# Patient Record
Sex: FEMALE | Race: WHITE | NOT HISPANIC OR LATINO
[De-identification: names, ages, dates, MRNs, and addresses within clinical notes are randomized per-mention and may not be internally consistent; named-entity substitution may affect disease eponyms.]

---

## 2018-10-16 ENCOUNTER — RESULT REVIEW (OUTPATIENT)
Age: 38
End: 2018-10-16

## 2019-08-11 PROBLEM — Z00.00 ENCOUNTER FOR PREVENTIVE HEALTH EXAMINATION: Status: ACTIVE | Noted: 2019-08-11

## 2019-09-10 ENCOUNTER — APPOINTMENT (OUTPATIENT)
Dept: BREAST CENTER | Facility: CLINIC | Age: 39
End: 2019-09-10
Payer: COMMERCIAL

## 2019-09-10 VITALS
BODY MASS INDEX: 19.99 KG/M2 | OXYGEN SATURATION: 99 % | RESPIRATION RATE: 18 BRPM | WEIGHT: 135 LBS | HEART RATE: 83 BPM | DIASTOLIC BLOOD PRESSURE: 79 MMHG | HEIGHT: 69 IN | SYSTOLIC BLOOD PRESSURE: 106 MMHG

## 2019-09-10 PROCEDURE — 99203 OFFICE O/P NEW LOW 30 MIN: CPT

## 2019-09-20 ENCOUNTER — RESULT REVIEW (OUTPATIENT)
Age: 39
End: 2019-09-20

## 2019-10-14 ENCOUNTER — APPOINTMENT (OUTPATIENT)
Dept: BREAST CENTER | Facility: CLINIC | Age: 39
End: 2019-10-14
Payer: COMMERCIAL

## 2019-10-14 VITALS
RESPIRATION RATE: 18 BRPM | BODY MASS INDEX: 19.99 KG/M2 | SYSTOLIC BLOOD PRESSURE: 134 MMHG | HEIGHT: 69 IN | DIASTOLIC BLOOD PRESSURE: 86 MMHG | OXYGEN SATURATION: 100 % | WEIGHT: 135 LBS | HEART RATE: 95 BPM

## 2019-10-14 PROCEDURE — 99213 OFFICE O/P EST LOW 20 MIN: CPT

## 2019-11-26 ENCOUNTER — APPOINTMENT (OUTPATIENT)
Dept: PLASTIC SURGERY | Facility: CLINIC | Age: 39
End: 2019-11-26
Payer: COMMERCIAL

## 2019-11-26 VITALS
HEART RATE: 81 BPM | RESPIRATION RATE: 18 BRPM | OXYGEN SATURATION: 100 % | DIASTOLIC BLOOD PRESSURE: 80 MMHG | SYSTOLIC BLOOD PRESSURE: 107 MMHG

## 2019-11-26 PROCEDURE — 99202 OFFICE O/P NEW SF 15 MIN: CPT

## 2019-12-02 ENCOUNTER — APPOINTMENT (OUTPATIENT)
Dept: BREAST CENTER | Facility: CLINIC | Age: 39
End: 2019-12-02
Payer: COMMERCIAL

## 2019-12-02 VITALS
SYSTOLIC BLOOD PRESSURE: 120 MMHG | DIASTOLIC BLOOD PRESSURE: 72 MMHG | OXYGEN SATURATION: 100 % | HEIGHT: 69 IN | RESPIRATION RATE: 18 BRPM | BODY MASS INDEX: 19.99 KG/M2 | HEART RATE: 68 BPM | WEIGHT: 135 LBS

## 2019-12-02 PROCEDURE — 99213 OFFICE O/P EST LOW 20 MIN: CPT

## 2019-12-02 NOTE — HISTORY OF PRESENT ILLNESS
[FreeTextEntry1] : 38 y/o woman with strong family history of breast cancer and BRCA2 VUS.\par Collecting information to make decision about undergoing prophylactic mastectomy.\par Today we reviewed options for breast reconstruction.\par \par PMH significant for DVT (clotting disorder). \par History of multiple orthopaedic and plastic surgery procedures, which were uneventful.\par \par B/l b cup breasts. Nonptotic. \par Slender abdomen. \par \par Today we reviewed all options for breast reconstruction including implant based and autologous tissue based.\par Patient wishes to undergo implant based reconstruction. This would most likely be performed as a two stage TE to implant reconstruction.\par NAture and timing of the surgery, risks, benefits, alternatives, expected postoperative course, recovery and long term results were reviewed. All questions were answered.\par Ms. Alva will further consider her decision about undergoing prophylactic surgery.

## 2020-01-14 ENCOUNTER — RESULT REVIEW (OUTPATIENT)
Age: 40
End: 2020-01-14

## 2020-01-15 ENCOUNTER — APPOINTMENT (OUTPATIENT)
Dept: BREAST CENTER | Facility: HOSPITAL | Age: 40
End: 2020-01-15
Payer: COMMERCIAL

## 2020-01-15 PROCEDURE — 19303 MAST SIMPLE COMPLETE: CPT | Mod: 50

## 2020-01-21 ENCOUNTER — APPOINTMENT (OUTPATIENT)
Dept: PLASTIC SURGERY | Facility: CLINIC | Age: 40
End: 2020-01-21
Payer: COMMERCIAL

## 2020-01-21 VITALS
WEIGHT: 135 LBS | DIASTOLIC BLOOD PRESSURE: 89 MMHG | HEART RATE: 96 BPM | RESPIRATION RATE: 18 BRPM | OXYGEN SATURATION: 100 % | SYSTOLIC BLOOD PRESSURE: 129 MMHG | BODY MASS INDEX: 19.99 KG/M2 | HEIGHT: 69 IN

## 2020-01-21 PROCEDURE — 99024 POSTOP FOLLOW-UP VISIT: CPT

## 2020-01-21 NOTE — HISTORY OF PRESENT ILLNESS
[FreeTextEntry1] : 1 week s/p b/l mastectomy and total muscular coverage TE placement.\par Expanders used are: Ref [id number] with 400 cc capacity. Prefilled to 150 cc.\par Current volumes are:\par Lpdx=202\par Right=150\par Incisions intact, no collections, no infections. No pain issues.\par B/l drains removed.\par Will begin expansion in 2 weeks.

## 2020-01-31 ENCOUNTER — APPOINTMENT (OUTPATIENT)
Dept: PLASTIC SURGERY | Facility: CLINIC | Age: 40
End: 2020-01-31
Payer: COMMERCIAL

## 2020-01-31 VITALS
HEIGHT: 69 IN | RESPIRATION RATE: 18 BRPM | BODY MASS INDEX: 19.85 KG/M2 | DIASTOLIC BLOOD PRESSURE: 76 MMHG | OXYGEN SATURATION: 100 % | HEART RATE: 100 BPM | WEIGHT: 134 LBS | SYSTOLIC BLOOD PRESSURE: 132 MMHG

## 2020-01-31 PROCEDURE — 99024 POSTOP FOLLOW-UP VISIT: CPT

## 2020-02-03 NOTE — HISTORY OF PRESENT ILLNESS
[FreeTextEntry1] : s/p b/l mastectomy and total muscular coverage TE placement.\par Expanders used are: Ref [id number] with 400 cc capacity. Prefilled to 150 cc.\par Current volumes are:\par Nwhd=433\par Right=150\par Incisions intact, no collections, no infections. No pain issues.\par Patient developed 2x3 cm area of skin blistering over left breast. Advised to continue aquaphor/nonstick gauze dressing.\par Pollow up in 1 week.\par Will begin expansion in 2 weeks.

## 2020-02-04 ENCOUNTER — APPOINTMENT (OUTPATIENT)
Dept: PLASTIC SURGERY | Facility: CLINIC | Age: 40
End: 2020-02-04
Payer: COMMERCIAL

## 2020-02-04 VITALS — OXYGEN SATURATION: 100 % | HEART RATE: 81 BPM | DIASTOLIC BLOOD PRESSURE: 89 MMHG | SYSTOLIC BLOOD PRESSURE: 124 MMHG

## 2020-02-04 PROCEDURE — 99024 POSTOP FOLLOW-UP VISIT: CPT

## 2020-02-04 NOTE — HISTORY OF PRESENT ILLNESS
[FreeTextEntry1] : s/p b/l mastectomy and total muscular coverage TE placement.\par Expanders used are: Ref [id number] with 400 cc capacity. Prefilled to 150 cc.\par Current volumes are:\par Pahc=907\par Right=150\par Incisions intact, no collections, no infections. No pain issues.\par Patient developed 2x3 cm area of skin blistering over left breast. Advised to continue aquaphor/nonstick gauze dressing. Area now healed.\par Follow up in 1 week to begin expansions

## 2020-02-11 ENCOUNTER — APPOINTMENT (OUTPATIENT)
Dept: PLASTIC SURGERY | Facility: CLINIC | Age: 40
End: 2020-02-11
Payer: COMMERCIAL

## 2020-02-11 VITALS
RESPIRATION RATE: 18 BRPM | WEIGHT: 134 LBS | HEIGHT: 69 IN | HEART RATE: 88 BPM | SYSTOLIC BLOOD PRESSURE: 129 MMHG | OXYGEN SATURATION: 99 % | BODY MASS INDEX: 19.85 KG/M2 | DIASTOLIC BLOOD PRESSURE: 89 MMHG

## 2020-02-11 PROCEDURE — 99024 POSTOP FOLLOW-UP VISIT: CPT

## 2020-02-13 NOTE — HISTORY OF PRESENT ILLNESS
[FreeTextEntry1] : s/p b/l mastectomy and total muscular coverage TE placement.\par Expanders used are: Ref [id number] with 400 cc capacity. Prefilled to 150 cc.\par Today 120 cc added to each expander\par Current volumes are:\par Adkw=847\par Right=270\par Incisions intact, no collections, no infections. No pain issues.\par \par Follow up in 1 week for additional expansion

## 2020-02-24 ENCOUNTER — APPOINTMENT (OUTPATIENT)
Dept: PLASTIC SURGERY | Facility: CLINIC | Age: 40
End: 2020-02-24
Payer: COMMERCIAL

## 2020-02-24 ENCOUNTER — APPOINTMENT (OUTPATIENT)
Dept: PLASTIC SURGERY | Facility: CLINIC | Age: 40
End: 2020-02-24

## 2020-02-24 VITALS
RESPIRATION RATE: 16 BRPM | DIASTOLIC BLOOD PRESSURE: 79 MMHG | HEART RATE: 96 BPM | WEIGHT: 134 LBS | SYSTOLIC BLOOD PRESSURE: 120 MMHG | OXYGEN SATURATION: 98 % | BODY MASS INDEX: 19.85 KG/M2 | HEIGHT: 69 IN

## 2020-02-24 PROCEDURE — 99024 POSTOP FOLLOW-UP VISIT: CPT

## 2020-03-03 ENCOUNTER — APPOINTMENT (OUTPATIENT)
Dept: PLASTIC SURGERY | Facility: CLINIC | Age: 40
End: 2020-03-03
Payer: COMMERCIAL

## 2020-03-03 VITALS
RESPIRATION RATE: 16 BRPM | DIASTOLIC BLOOD PRESSURE: 87 MMHG | WEIGHT: 134 LBS | SYSTOLIC BLOOD PRESSURE: 124 MMHG | OXYGEN SATURATION: 99 % | HEIGHT: 69 IN | HEART RATE: 81 BPM | BODY MASS INDEX: 19.85 KG/M2

## 2020-03-03 PROCEDURE — 99024 POSTOP FOLLOW-UP VISIT: CPT

## 2020-03-05 NOTE — HISTORY OF PRESENT ILLNESS
[FreeTextEntry1] : s/p b/l mastectomy and total muscular coverage TE placement.\par Expanders used are: Ref [id number] with 400 cc capacity. Prefilled to 150 cc.\par Today 120 cc added to each expander\par Current volumes are:\par Ggnr=892\par Right=390\par Incisions intact, no collections, no infections. No pain issues.\par \par Follow up in 1 week for additional expansion

## 2020-03-05 NOTE — HISTORY OF PRESENT ILLNESS
[FreeTextEntry1] : s/p b/l mastectomy and total muscular coverage TE placement.\par Expanders used are: Ref [id number] with 400 cc capacity. Prefilled to 150 cc.\par Today 60 cc added to each left expander and \par 90 cc added to right expander\par Current volumes are:\par Dcrp=938\par Right=480\par Incisions intact, no collections, no infections. No pain issues.\par \par Follow up in 1 week to assess volume and determine size of definitive implants

## 2020-03-17 ENCOUNTER — APPOINTMENT (OUTPATIENT)
Dept: PLASTIC SURGERY | Facility: CLINIC | Age: 40
End: 2020-03-17

## 2020-05-05 ENCOUNTER — APPOINTMENT (OUTPATIENT)
Dept: BREAST CENTER | Facility: CLINIC | Age: 40
End: 2020-05-05

## 2020-07-20 ENCOUNTER — APPOINTMENT (OUTPATIENT)
Dept: PLASTIC SURGERY | Facility: CLINIC | Age: 40
End: 2020-07-20

## 2020-07-28 ENCOUNTER — APPOINTMENT (OUTPATIENT)
Dept: PLASTIC SURGERY | Facility: CLINIC | Age: 40
End: 2020-07-28

## 2020-07-28 ENCOUNTER — APPOINTMENT (OUTPATIENT)
Dept: PLASTIC SURGERY | Facility: CLINIC | Age: 40
End: 2020-07-28
Payer: COMMERCIAL

## 2020-07-28 PROCEDURE — 99499 UNLISTED E&M SERVICE: CPT | Mod: NC

## 2020-08-18 ENCOUNTER — APPOINTMENT (OUTPATIENT)
Dept: PLASTIC SURGERY | Facility: CLINIC | Age: 40
End: 2020-08-18
Payer: COMMERCIAL

## 2020-08-18 VITALS
SYSTOLIC BLOOD PRESSURE: 144 MMHG | HEART RATE: 57 BPM | OXYGEN SATURATION: 100 % | HEIGHT: 69 IN | BODY MASS INDEX: 19.85 KG/M2 | DIASTOLIC BLOOD PRESSURE: 90 MMHG | WEIGHT: 134 LBS

## 2020-08-18 DIAGNOSIS — Z01.818 ENCOUNTER FOR OTHER PREPROCEDURAL EXAMINATION: ICD-10-CM

## 2020-08-18 PROCEDURE — 99214 OFFICE O/P EST MOD 30 MIN: CPT

## 2020-08-18 NOTE — HISTORY OF PRESENT ILLNESS
[FreeTextEntry1] : s/p b/l mastectomy and total muscular coverage TE placement.\par Expanders used are: Ref [id number] with 400 cc\par Current volumes are:\par Puyv=383\par Right=480\par Left expander has rotated slightly.\par Patient desires greater breast volume, beyond volume of expanders. Excellent skin quality and ellasticity. Anticipate mastectomy pockets to comfortably accommodate implants in 520-560 cc range.\par Surgery scheduled for next week.

## 2020-08-19 ENCOUNTER — LABORATORY RESULT (OUTPATIENT)
Age: 40
End: 2020-08-19

## 2020-08-19 ENCOUNTER — APPOINTMENT (OUTPATIENT)
Dept: PLASTIC SURGERY | Facility: CLINIC | Age: 40
End: 2020-08-19

## 2020-08-21 ENCOUNTER — RESULT REVIEW (OUTPATIENT)
Age: 40
End: 2020-08-21

## 2020-08-24 ENCOUNTER — RESULT REVIEW (OUTPATIENT)
Age: 40
End: 2020-08-24

## 2020-09-01 ENCOUNTER — APPOINTMENT (OUTPATIENT)
Dept: PLASTIC SURGERY | Facility: CLINIC | Age: 40
End: 2020-09-01
Payer: COMMERCIAL

## 2020-09-01 VITALS
BODY MASS INDEX: 19.55 KG/M2 | RESPIRATION RATE: 18 BRPM | WEIGHT: 132 LBS | OXYGEN SATURATION: 100 % | HEIGHT: 69 IN | SYSTOLIC BLOOD PRESSURE: 141 MMHG | HEART RATE: 103 BPM | DIASTOLIC BLOOD PRESSURE: 84 MMHG

## 2020-09-01 PROCEDURE — 99024 POSTOP FOLLOW-UP VISIT: CPT

## 2020-09-01 NOTE — HISTORY OF PRESENT ILLNESS
[FreeTextEntry1] : s/p TE lashanda implant exchange last week.\par Implants are Ref#: [id number] b/l\par Incisions intact, no collections, no infections.\par Good shape, size, symmetry.\par Wound care and activity limitations reviewed.\par Follow up in 3 months.

## 2021-04-13 ENCOUNTER — APPOINTMENT (OUTPATIENT)
Dept: PLASTIC SURGERY | Facility: CLINIC | Age: 41
End: 2021-04-13
Payer: COMMERCIAL

## 2021-04-13 PROCEDURE — 99072 ADDL SUPL MATRL&STAF TM PHE: CPT

## 2021-04-13 PROCEDURE — 99212 OFFICE O/P EST SF 10 MIN: CPT

## 2021-04-13 NOTE — HISTORY OF PRESENT ILLNESS
[FreeTextEntry1] : 8 months s/p TE lashanda implant exchange. \par Implants are Ref#: [id number] b/l\par implants in place b/l\par Good shape, size, symmetry.\par rippling and contour irregularity in left breast due to lack of soft tissue coverage and minor widening of pocket, can revise with fat grafting and tightening the skin envelope but she is not interested in surgery at this time\par

## 2021-07-14 ENCOUNTER — RESULT REVIEW (OUTPATIENT)
Age: 41
End: 2021-07-14

## 2021-08-10 ENCOUNTER — APPOINTMENT (OUTPATIENT)
Dept: HEMATOLOGY ONCOLOGY | Facility: CLINIC | Age: 41
End: 2021-08-10

## 2021-10-06 ENCOUNTER — APPOINTMENT (OUTPATIENT)
Dept: BREAST CENTER | Facility: CLINIC | Age: 41
End: 2021-10-06
Payer: COMMERCIAL

## 2021-11-10 ENCOUNTER — APPOINTMENT (OUTPATIENT)
Dept: BREAST CENTER | Facility: CLINIC | Age: 41
End: 2021-11-10
Payer: COMMERCIAL

## 2021-11-10 ENCOUNTER — NON-APPOINTMENT (OUTPATIENT)
Age: 41
End: 2021-11-10

## 2021-11-10 VITALS
HEART RATE: 73 BPM | HEIGHT: 69 IN | SYSTOLIC BLOOD PRESSURE: 128 MMHG | WEIGHT: 150 LBS | BODY MASS INDEX: 22.22 KG/M2 | DIASTOLIC BLOOD PRESSURE: 79 MMHG

## 2021-11-10 DIAGNOSIS — Z87.42 PERSONAL HISTORY OF OTHER DISEASES OF THE FEMALE GENITAL TRACT: ICD-10-CM

## 2021-11-10 DIAGNOSIS — Z83.2 FAMILY HISTORY OF DISEASES OF THE BLOOD AND BLOOD-FORMING ORGANS AND CERTAIN DISORDERS INVOLVING THE IMMUNE MECHANISM: ICD-10-CM

## 2021-11-10 DIAGNOSIS — D68.59 OTHER PRIMARY THROMBOPHILIA: ICD-10-CM

## 2021-11-10 DIAGNOSIS — Z80.3 FAMILY HISTORY OF MALIGNANT NEOPLASM OF BREAST: ICD-10-CM

## 2021-11-10 DIAGNOSIS — Z80.6 FAMILY HISTORY OF LEUKEMIA: ICD-10-CM

## 2021-11-10 DIAGNOSIS — Z86.2 PERSONAL HISTORY OF DISEASES OF THE BLOOD AND BLOOD-FORMING ORGANS AND CERTAIN DISORDERS INVOLVING THE IMMUNE MECHANISM: ICD-10-CM

## 2021-11-10 DIAGNOSIS — Z87.891 PERSONAL HISTORY OF NICOTINE DEPENDENCE: ICD-10-CM

## 2021-11-10 DIAGNOSIS — Z78.9 OTHER SPECIFIED HEALTH STATUS: ICD-10-CM

## 2021-11-10 PROCEDURE — 99215 OFFICE O/P EST HI 40 MIN: CPT

## 2021-11-10 RX ORDER — DIAZEPAM 5 MG/1
5 TABLET ORAL 3 TIMES DAILY
Qty: 30 | Refills: 0 | Status: DISCONTINUED | COMMUNITY
Start: 2020-01-15 | End: 2021-11-10

## 2021-11-10 RX ORDER — OXYCODONE AND ACETAMINOPHEN 5; 325 MG/1; MG/1
5-325 TABLET ORAL
Qty: 40 | Refills: 0 | Status: DISCONTINUED | COMMUNITY
Start: 2020-08-24 | End: 2021-11-10

## 2021-11-10 RX ORDER — ALPRAZOLAM 2 MG/1
2 TABLET ORAL 3 TIMES DAILY
Qty: 6 | Refills: 0 | Status: DISCONTINUED | COMMUNITY
Start: 2020-08-18 | End: 2021-11-10
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
## 2021-11-10 NOTE — CONSULT LETTER
[Dear  ___] : Dear  [unfilled], [( Thank you for referring [unfilled] for consultation for _____ )] : Thank you for referring [unfilled] for consultation for [unfilled] [Please see my note below.] : Please see my note below. [Consult Closing:] : Thank you very much for allowing me to participate in the care of this patient.  If you have any questions, please do not hesitate to contact me. [Sincerely,] : Sincerely, [DrLoan  ___] : Dr. BERRIOS [FreeTextEntry1] : She will be getting breast MRI for implant evaluation and is interested in revision surgery for implant rippling. [FreeTextEntry3] : Ginger Dolan MS DO\par Breast Surgeon\par [facility] \[place] [address]\par

## 2021-11-10 NOTE — PHYSICAL EXAM
[Normocephalic] : normocephalic [Atraumatic] : atraumatic [Supple] : supple [No Supraclavicular Adenopathy] : no supraclavicular adenopathy [Examined in the supine and seated position] : examined in the supine and seated position [Symmetrical] : symmetrical [No dominant masses] : no dominant masses in right breast  [No dominant masses] : no dominant masses left breast [No Nipple Retraction] : no left nipple retraction [No Nipple Discharge] : no left nipple discharge [No Axillary Lymphadenopathy] : no left axillary lymphadenopathy [No Edema] : no edema [No Rashes] : no rashes [No Ulceration] : no ulceration [de-identified] : s/p NSM and implant reconstx, no masses [de-identified] : in area of patient concern inner quadrant there is rippling, no masses or suspicious skin changes, there is lateral movement of the pocket

## 2021-11-18 ENCOUNTER — NON-APPOINTMENT (OUTPATIENT)
Age: 41
End: 2021-11-18

## 2021-11-23 ENCOUNTER — APPOINTMENT (OUTPATIENT)
Dept: PLASTIC SURGERY | Facility: CLINIC | Age: 41
End: 2021-11-23
Payer: COMMERCIAL

## 2021-11-23 VITALS
RESPIRATION RATE: 16 BRPM | SYSTOLIC BLOOD PRESSURE: 118 MMHG | BODY MASS INDEX: 22.22 KG/M2 | OXYGEN SATURATION: 100 % | WEIGHT: 150 LBS | HEART RATE: 76 BPM | HEIGHT: 69 IN | DIASTOLIC BLOOD PRESSURE: 82 MMHG

## 2021-11-23 PROCEDURE — 99024 POSTOP FOLLOW-UP VISIT: CPT

## 2021-11-23 NOTE — HISTORY OF PRESENT ILLNESS
[FreeTextEntry1] : Patient is 21 months s/p TE to implant exchange. She reports rippling of left breast and feels as if the implant has air bubbles. Also states contour deformity/divot on left side of upper breast.  Patient underwent hysterectomy in August, and does not want to proceed with any additional surgery at this time. \par \par Implants are Ref#: [id number] b/l\par \par \par Good shape, size, symmetry.\par Left breast: Rippling and contour irregularity upper breast due to lack of soft tissue coverage and minor widening of pocket. No capsular contracture noted B/L. \par \par A/P:\par - Discussed revision with fat grafting\par - Reviewed risks, benefits, and post operative course. \par \par \par \par

## 2021-12-01 NOTE — ASSESSMENT
[FreeTextEntry1] :  42 yo female with high risk\par reviewed her risk status\par We reviewed risk reduction strategies including maintaining a BMI <25, limiting red meat intake and alcoholic beverages to 3 per week and exercise (150 min/ week low intensity or 75 min/week high intensity). And maintaining a normal vitamin D level.\par \par discussed MRI, she is amenable to this, reviewed risk of false positives\par plan MRI asap\par rec follow up with Dr. Smith for revision surgery post MRI\par f/u 6 months\par She knows to call or return sooner should any concerns or questions arise.\par \par 
Detail Level: Detailed

## 2022-02-01 ENCOUNTER — RESULT REVIEW (OUTPATIENT)
Age: 42
End: 2022-02-01

## 2022-03-14 ENCOUNTER — NON-APPOINTMENT (OUTPATIENT)
Age: 42
End: 2022-03-14

## 2022-03-15 ENCOUNTER — NON-APPOINTMENT (OUTPATIENT)
Age: 42
End: 2022-03-15

## 2022-03-15 ENCOUNTER — APPOINTMENT (OUTPATIENT)
Dept: SURGERY | Facility: CLINIC | Age: 42
End: 2022-03-15
Payer: COMMERCIAL

## 2022-03-15 VITALS
BODY MASS INDEX: 20.14 KG/M2 | WEIGHT: 136 LBS | DIASTOLIC BLOOD PRESSURE: 82 MMHG | TEMPERATURE: 98.2 F | HEART RATE: 75 BPM | SYSTOLIC BLOOD PRESSURE: 119 MMHG | HEIGHT: 69 IN

## 2022-03-15 DIAGNOSIS — Z86.2 PERSONAL HISTORY OF DISEASES OF THE BLOOD AND BLOOD-FORMING ORGANS AND CERTAIN DISORDERS INVOLVING THE IMMUNE MECHANISM: ICD-10-CM

## 2022-03-15 DIAGNOSIS — K42.9 UMBILICAL HERNIA W/OUT OBSTRUCTION OR GANGRENE: ICD-10-CM

## 2022-03-15 PROCEDURE — 99204 OFFICE O/P NEW MOD 45 MIN: CPT

## 2022-03-15 NOTE — PHYSICAL EXAM
[Normal Heart Sounds] : normal heart sounds [Alert] : alert [Oriented to Person] : oriented to person [Oriented to Place] : oriented to place [Oriented to Time] : oriented to time [Calm] : calm [de-identified] : NAD [de-identified] : soft, small umbilical hernia, reducible partly , some bowel within , mildly tender,

## 2022-03-15 NOTE — PLAN
[FreeTextEntry1] : She will schedule the surgery and undergo PST/Covid testing prior. She will be instructed on postop anticoagulation use. \par

## 2022-03-15 NOTE — HISTORY OF PRESENT ILLNESS
[de-identified] : The patient is referred by Dr Sorensen for evaluation and discussion regarding an umbilical hernia that is enlarging and becoming more uncomfortable. She is now experiencing abd bloating, severe cramps and gas pains with obvious bowel sounds (borborygmi) with bloating. She feels constipated but eventually moves her bowels. Soon thereafter, and after meals, however she redevelops the symptoms. She is s/p robotic hysterectomy from August 2021 for a BRCA variant prophylaxis (is s/p bilateral simple mastectomy for the same). She also has ITP and usually takes postop anticoagulations meds, as directed by her Hematologist, Dr Kraus. \par

## 2022-03-15 NOTE — ASSESSMENT
[FreeTextEntry1] : symptomatic umbilical hernia, possible partial incarceration, would recommend diagnostic laparoscopy possibly enterolysis to assure there are no adhesions from her previous surgery and an umbilical hernia repair with mesh. \par The risks benefits and alternatives were discussed and the patient agrees to the described plan.\par

## 2022-03-21 ENCOUNTER — APPOINTMENT (OUTPATIENT)
Dept: SURGERY | Facility: HOSPITAL | Age: 42
End: 2022-03-21

## 2022-03-31 ENCOUNTER — APPOINTMENT (OUTPATIENT)
Dept: SURGERY | Facility: CLINIC | Age: 42
End: 2022-03-31
Payer: COMMERCIAL

## 2022-03-31 VITALS
HEIGHT: 69 IN | BODY MASS INDEX: 20.14 KG/M2 | DIASTOLIC BLOOD PRESSURE: 61 MMHG | TEMPERATURE: 97.6 F | HEART RATE: 68 BPM | SYSTOLIC BLOOD PRESSURE: 118 MMHG | WEIGHT: 136 LBS

## 2022-03-31 DIAGNOSIS — Z09 ENCOUNTER FOR FOLLOW-UP EXAMINATION AFTER COMPLETED TREATMENT FOR CONDITIONS OTHER THAN MALIGNANT NEOPLASM: ICD-10-CM

## 2022-03-31 PROCEDURE — 99024 POSTOP FOLLOW-UP VISIT: CPT | Mod: 24

## 2022-03-31 PROCEDURE — 99213 OFFICE O/P EST LOW 20 MIN: CPT | Mod: 24

## 2022-03-31 NOTE — PHYSICAL EXAM
[Normal Breath Sounds] : Normal breath sounds [Normal Heart Sounds] : normal heart sounds [Abdominal Masses] : No abdominal masses [Abdomen Tenderness] : ~T ~M Abdominal tenderness [No Rash or Lesion] : No rash or lesion [Alert] : alert [Oriented to Person] : oriented to person [Oriented to Place] : oriented to place [Oriented to Time] : oriented to time [Calm] : calm [de-identified] : appears in pain [de-identified] : McBurney's tenderness with rebound, +Psoas sign, + Rovsing sign

## 2022-03-31 NOTE — REVIEW OF SYSTEMS
[Fever] : no fever [Chills] : no chills [Feeling Poorly] : feeling poorly [Abdominal Pain] : abdominal pain [Vomiting] : no vomiting [Constipation] : no constipation [Heartburn] : no heartburn [Negative] : Genitourinary

## 2022-03-31 NOTE — HISTORY OF PRESENT ILLNESS
[de-identified] : Pt was here for postop umbilical hernia check and has done well from this. No issues. However over the last 2 to 4 hours developed a progressive RLQ pain now causing doubling over pain when moved or palpated. No N/V/D/C. No fevers or chills. But the pain is severe on the right side. She is s/p a hysterectomy but does have both ovaries.

## 2022-03-31 NOTE — ASSESSMENT
[FreeTextEntry1] : no postop umbilical hernia issues- wound clean, dry, nontender, however new onset of sudden RLQ tenderness with rebound a concern. Referred to ED for further w/u with labs and CT scan.

## 2022-05-16 ENCOUNTER — APPOINTMENT (OUTPATIENT)
Dept: BREAST CENTER | Facility: CLINIC | Age: 42
End: 2022-05-16
Payer: COMMERCIAL

## 2022-05-16 VITALS
HEART RATE: 83 BPM | BODY MASS INDEX: 20.14 KG/M2 | DIASTOLIC BLOOD PRESSURE: 77 MMHG | WEIGHT: 136 LBS | HEIGHT: 69 IN | SYSTOLIC BLOOD PRESSURE: 115 MMHG

## 2022-05-16 PROCEDURE — 99213 OFFICE O/P EST LOW 20 MIN: CPT

## 2022-05-16 NOTE — CONSULT LETTER
[Dear  ___] : Dear  [unfilled], [Please see my note below.] : Please see my note below. [Consult Closing:] : Thank you very much for allowing me to participate in the care of this patient.  If you have any questions, please do not hesitate to contact me. [Sincerely,] : Sincerely, [Courtesy Letter:] : I had the pleasure of seeing your patient, [unfilled], in my office today. [FreeTextEntry3] : Ginger Dolan MS DO\par Breast Surgeon\par [facility] \[place] [address]\par  [DrLoan  ___] : Dr. BERRIOS

## 2022-05-16 NOTE — PHYSICAL EXAM
[Normocephalic] : normocephalic [Atraumatic] : atraumatic [Supple] : supple [No Supraclavicular Adenopathy] : no supraclavicular adenopathy [Examined in the supine and seated position] : examined in the supine and seated position [Symmetrical] : symmetrical [No dominant masses] : no dominant masses in right breast  [No dominant masses] : no dominant masses left breast [No Nipple Retraction] : no left nipple retraction [No Nipple Discharge] : no left nipple discharge [No Axillary Lymphadenopathy] : no left axillary lymphadenopathy [No Edema] : no edema [No Rashes] : no rashes [No Ulceration] : no ulceration [de-identified] : s/p NSM and implant reconstx, no masses [de-identified] : in area of patient concern inner quadrant there is rippling, no masses or suspicious skin changes, there is lateral movement of the pocket, reassured her the lumps she thinks she is seeing her implant protruding
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
## 2022-06-20 ENCOUNTER — NON-APPOINTMENT (OUTPATIENT)
Age: 42
End: 2022-06-20

## 2022-09-06 ENCOUNTER — APPOINTMENT (OUTPATIENT)
Dept: SURGERY | Facility: CLINIC | Age: 42
End: 2022-09-06

## 2022-09-22 ENCOUNTER — APPOINTMENT (OUTPATIENT)
Dept: SURGERY | Facility: CLINIC | Age: 42
End: 2022-09-22

## 2022-09-22 DIAGNOSIS — R10.11 RIGHT UPPER QUADRANT PAIN: ICD-10-CM

## 2022-09-29 ENCOUNTER — APPOINTMENT (OUTPATIENT)
Dept: SURGERY | Facility: CLINIC | Age: 42
End: 2022-09-29

## 2022-10-06 ENCOUNTER — APPOINTMENT (OUTPATIENT)
Dept: SURGERY | Facility: CLINIC | Age: 42
End: 2022-10-06

## 2022-10-06 VITALS
SYSTOLIC BLOOD PRESSURE: 131 MMHG | OXYGEN SATURATION: 98 % | TEMPERATURE: 98 F | BODY MASS INDEX: 20.67 KG/M2 | HEART RATE: 84 BPM | DIASTOLIC BLOOD PRESSURE: 87 MMHG | WEIGHT: 140 LBS

## 2022-10-06 PROCEDURE — 99213 OFFICE O/P EST LOW 20 MIN: CPT

## 2022-10-06 NOTE — ASSESSMENT
[FreeTextEntry1] : discussed symptoms and signs of a recurrent hernia vs muscle (scar tissue) strain. I do not feel a defect on my exam today . I think a trail of rest, antiinflammatories (NSAIDS, ice) is warranted prior to repeating any studies since my suspicion of a recurrent hernia is low at this point. She will try these maneuvers to decrease use and see if this improves her symptoms. She will f/u 1 month to reassess progress. She is happy with this plan. If she remains with the same pain and symptoms on return, would then consider u/s or Ct scan to assess further for a recurrent hernia or other cause. I do not think this is a back source but this should be considered if the pains remain. .

## 2022-10-06 NOTE — PHYSICAL EXAM
[de-identified] : NAD [de-identified] : soft, small tender right lateral incision ridge c/w inflammation, but no defect or ballotable bulge palpated,

## 2022-10-06 NOTE — HISTORY OF PRESENT ILLNESS
[de-identified] : The patient is s/p an umbilical hernia repair 3/21/22. She had been having abdominal pain  as well and at that time was noted to have a floppy (mobile)  cecum and sigmoid colon. Since the surgery she no longer feels the GI issues as she had before. However, during this past summer, about 2 months after surgery, she traveled [place] and felt she strained her abdomen with moving of luggage . She felt a pain at one pain which has not really subsided.  and is concerned she jeopardized the repair. She feels tenderness and pain at the right lateral edge of the incision and says she notices a bulge at times.  Currently has no GI complaints.

## 2022-10-26 NOTE — ASSESSMENT
Anorectal Manometry performed, pt tolerated procedure well with no immediate complications noted. Pt VSS after procedure. Discharge instructions given verbally to pt. [FreeTextEntry1] : 42 yo female with high risk\par reviewed her risk status\par We reviewed risk reduction strategies including maintaining a BMI <25, limiting red meat intake and alcoholic beverages to 3 per week and exercise (150 min/ week low intensity or 75 min/week high intensity). And maintaining a normal vitamin D level.\par \par \par plan MRI 11/2025\par \par f/u 6 months then annually since she sees Dr. Sorensen in spring 2023\par She knows to call or return sooner should any concerns or questions arise.\par \par

## 2022-11-03 ENCOUNTER — APPOINTMENT (OUTPATIENT)
Dept: SURGERY | Facility: CLINIC | Age: 42
End: 2022-11-03

## 2022-11-08 ENCOUNTER — APPOINTMENT (OUTPATIENT)
Dept: PLASTIC SURGERY | Facility: CLINIC | Age: 42
End: 2022-11-08

## 2022-11-08 PROCEDURE — 99212 OFFICE O/P EST SF 10 MIN: CPT

## 2022-11-13 NOTE — HISTORY OF PRESENT ILLNESS
[FreeTextEntry1] : s/p b/l mastectomy and implant breast reconstruction. Reports numbness and phantom sensations and numbness over left breast.\par Reports two subcutaneous masses over neck (anterior and left). Present for 6 months. Treated by dermatologist with steroid injections, but now recurred.\par \par Exam: good quality breast reconstruction\par Anterior and left neck mass 2-3 cm each, likely sebaceous cyst.\par \par No plan for intervention for breast reconstruction.\par Will coordinate office procedure for neck cyst excisions.\par \par \par

## 2022-11-29 ENCOUNTER — LABORATORY RESULT (OUTPATIENT)
Age: 42
End: 2022-11-29

## 2022-11-29 ENCOUNTER — RESULT REVIEW (OUTPATIENT)
Age: 42
End: 2022-11-29

## 2022-11-29 ENCOUNTER — APPOINTMENT (OUTPATIENT)
Dept: PLASTIC SURGERY | Facility: CLINIC | Age: 42
End: 2022-11-29

## 2022-11-29 PROCEDURE — 21552 EXC NECK LES SC 3 CM/>: CPT

## 2022-11-29 PROCEDURE — 21555 EXC NECK LES SC < 3 CM: CPT | Mod: 59

## 2022-12-01 NOTE — HISTORY OF PRESENT ILLNESS
[FreeTextEntry1] : s/p b/l mastectomy and implant breast reconstruction. \par Reports two subcutaneous masses over neck (anterior and left). Present for 6 months. Treated by dermatologist with steroid injections, but now recurred.\par \par Exam: \par Anterior and left neck mass 1-2 cm each, likely sebaceous cyst.\par \par \par

## 2022-12-06 ENCOUNTER — APPOINTMENT (OUTPATIENT)
Dept: PLASTIC SURGERY | Facility: CLINIC | Age: 42
End: 2022-12-06

## 2022-12-06 PROCEDURE — 99024 POSTOP FOLLOW-UP VISIT: CPT

## 2022-12-07 NOTE — PROCEDURE
[FreeTextEntry1] : 1. anterior neck mass, 2. left neck mass [FreeTextEntry2] : Excision of 1. anterior neck mass, 2. left neck mass [FreeTextEntry3] : lidocaine with epinephrine [FreeTextEntry4] : none [FreeTextEntry5] : none [FreeTextEntry6] : 3 cc lidocaine with epinephrine injected surrounding each neck mass.\par \par Skin prepped and draped in sterile fashion.\par \par Left neck mass excised. Hemostasis obtained. Skin closed with 4-0 monocryl and 6-0 nylon sutures.\par \par Anterior neck mass excised. Hemostasis obtained. Skin closed with 4-0 monocryl and 6-0 nylon sutures.\par \par Tolerated well.\par Tissue sent for pathology review.\par Follow up next week for suture removal.\par

## 2023-01-06 DIAGNOSIS — Z12.31 ENCOUNTER FOR SCREENING MAMMOGRAM FOR MALIGNANT NEOPLASM OF BREAST: ICD-10-CM

## 2023-01-09 ENCOUNTER — APPOINTMENT (OUTPATIENT)
Dept: BREAST CENTER | Facility: CLINIC | Age: 43
End: 2023-01-09
Payer: COMMERCIAL

## 2023-01-09 VITALS
SYSTOLIC BLOOD PRESSURE: 119 MMHG | HEIGHT: 69 IN | WEIGHT: 140 LBS | HEART RATE: 76 BPM | BODY MASS INDEX: 20.73 KG/M2 | DIASTOLIC BLOOD PRESSURE: 75 MMHG

## 2023-01-09 DIAGNOSIS — Z98.82 BREAST IMPLANT STATUS: ICD-10-CM

## 2023-01-09 PROCEDURE — 99213 OFFICE O/P EST LOW 20 MIN: CPT

## 2023-01-09 NOTE — ASSESSMENT
[FreeTextEntry1] : 41 yo female with high risk\par reviewed her risk status\par We reviewed risk reduction strategies including maintaining a BMI <25, limiting red meat intake and alcoholic beverages to 3 per week and exercise (150 min/ week low intensity or 75 min/week high intensity). And maintaining a normal vitamin D level.\par \par plan MRI 11/2025\par \par f/u with me 1 year, Dr. Sorensen 6 months\par She knows to call or return sooner should any concerns or questions arise.\par \par
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
## 2023-01-09 NOTE — PHYSICAL EXAM
[Normocephalic] : normocephalic [Atraumatic] : atraumatic [Supple] : supple [No Supraclavicular Adenopathy] : no supraclavicular adenopathy [Examined in the supine and seated position] : examined in the supine and seated position [Symmetrical] : symmetrical [No dominant masses] : no dominant masses in right breast  [No dominant masses] : no dominant masses left breast [No Nipple Retraction] : no left nipple retraction [No Nipple Discharge] : no left nipple discharge [No Axillary Lymphadenopathy] : no left axillary lymphadenopathy [No Edema] : no edema [No Rashes] : no rashes [No Ulceration] : no ulceration [de-identified] : s/p NSM and implant reconstx, no masses [de-identified] : there is lateral movement of the pocket, reassured her the lumps she thinks she is seeing her implant protruding

## 2023-01-09 NOTE — CONSULT LETTER
[Dear  ___] : Dear  [unfilled], [Courtesy Letter:] : I had the pleasure of seeing your patient, [unfilled], in my office today. [Please see my note below.] : Please see my note below. [Consult Closing:] : Thank you very much for allowing me to participate in the care of this patient.  If you have any questions, please do not hesitate to contact me. [Sincerely,] : Sincerely, [DrLoan  ___] : Dr. BERRIOS [FreeTextEntry3] : Ginger Dolan MS DO\par Breast Surgeon\par [facility] \[place] [address]\par

## 2023-04-14 ENCOUNTER — APPOINTMENT (OUTPATIENT)
Dept: SURGERY | Facility: CLINIC | Age: 43
End: 2023-04-14
Payer: COMMERCIAL

## 2023-04-14 VITALS
DIASTOLIC BLOOD PRESSURE: 78 MMHG | HEART RATE: 84 BPM | TEMPERATURE: 98.7 F | HEIGHT: 69 IN | BODY MASS INDEX: 21.03 KG/M2 | SYSTOLIC BLOOD PRESSURE: 119 MMHG | WEIGHT: 142 LBS

## 2023-04-14 PROCEDURE — 99213 OFFICE O/P EST LOW 20 MIN: CPT

## 2023-04-14 NOTE — HISTORY OF PRESENT ILLNESS
[de-identified] : s/p open umbilical hernia repair w/ diagnostic laparoscopy March 2022 [de-identified] : Last seen October 2022 by Dr. Holbrook.  Prior to surgery patient had been having abdominal pain as well and at that time was noted to have a floppy (mobile) cecum and sigmoid colon. Since the surgery she no longer feels the GI issues as she had before. Last summer she did have right sided pain around incision but that was not felt to be a recurrence and likely muscle strain.  No imaging done at that time.  \par Presents today with pain similar to last October but worse.  Started Monday.  Improved in AM and worse as day progresses after activity.  Notices slight bulge to side of umbilicus.  Also with recurrent issues with constipation

## 2023-04-14 NOTE — PHYSICAL EXAM
[Respiratory Effort] : normal respiratory effort [Normal Rate and Rhythm] : normal rate and rhythm [No Rash or Lesion] : No rash or lesion [Alert] : alert [Calm] : calm [de-identified] : NAD [de-identified] : soft, tender right midabdomen and umbilicus, can feel fascial edges but can not elicit if true hernia defect or mesh is still in place

## 2023-04-28 ENCOUNTER — NON-APPOINTMENT (OUTPATIENT)
Age: 43
End: 2023-04-28

## 2023-05-02 ENCOUNTER — NON-APPOINTMENT (OUTPATIENT)
Age: 43
End: 2023-05-02

## 2023-05-03 ENCOUNTER — APPOINTMENT (OUTPATIENT)
Dept: SURGERY | Facility: CLINIC | Age: 43
End: 2023-05-03
Payer: COMMERCIAL

## 2023-05-03 VITALS — TEMPERATURE: 98.4 F | HEART RATE: 76 BPM | SYSTOLIC BLOOD PRESSURE: 125 MMHG | DIASTOLIC BLOOD PRESSURE: 82 MMHG

## 2023-05-03 PROCEDURE — 99213 OFFICE O/P EST LOW 20 MIN: CPT

## 2023-05-03 NOTE — PHYSICAL EXAM
[Respiratory Effort] : normal respiratory effort [Normal Rate and Rhythm] : normal rate and rhythm [No Rash or Lesion] : No rash or lesion [Alert] : alert [Calm] : calm [de-identified] : no hernia recurrence appreciated

## 2023-05-03 NOTE — HISTORY OF PRESENT ILLNESS
[de-identified] : pain after umbilical hernia repair [de-identified] : seen in ED Friday for severe abdominal pain around umbilical hernia repair.\par CT A/P: prior umbilical hernia repair with ventral bulging.  no defect appreciated.  periumbilical postsurgical changes without visible recurrent hernia\par \par Still with intense abdominal pain associated with activity that is centralized around her umbilicus.  Says it feels like a thousand crunches

## 2023-05-05 ENCOUNTER — APPOINTMENT (OUTPATIENT)
Dept: PAIN MANAGEMENT | Facility: CLINIC | Age: 43
End: 2023-05-05
Payer: COMMERCIAL

## 2023-05-05 ENCOUNTER — NON-APPOINTMENT (OUTPATIENT)
Age: 43
End: 2023-05-05

## 2023-05-05 VITALS
OXYGEN SATURATION: 99 % | BODY MASS INDEX: 21.03 KG/M2 | WEIGHT: 142 LBS | DIASTOLIC BLOOD PRESSURE: 80 MMHG | HEIGHT: 69 IN | SYSTOLIC BLOOD PRESSURE: 122 MMHG | HEART RATE: 94 BPM

## 2023-05-05 PROCEDURE — 99203 OFFICE O/P NEW LOW 30 MIN: CPT

## 2023-05-05 NOTE — ASSESSMENT
[FreeTextEntry1] : Ms. DYLAN CHRISTIAN is a 42 year F suffering from abdominal pain following umbilical hernia repair  that based upon today's subjective complaints, physical examination, and chart review is likely neuropathic in nature.\par \par >> Medications\par \par Chronic opioid use for non-malignant pain, in particular at high doses would not be recommended since it can potentially lead to hyperalgesia (hypersensitivity), tolerance and addiction. \par  \par Not amenable \par \par Flexeril 5 mg po bid prn spasms\par  \par >> Interventions\par  \par Consider for TAP vs Rectus sheath block\par  \par >> Therapy and Other Modalities\par  \par Apply Ice BID prn\par \par Abdominal Binder\par \par Continue with daily home stretching regimen\par \par >> Imaging and Other Studies\par \par See Media \par \par >> Consults\par \par None ordered

## 2023-05-05 NOTE — CONSULT LETTER
[Dear  ___] : Dear  [unfilled], [Please see my note below.] : Please see my note below. [Consult Closing:] : Thank you very much for allowing me to participate in the care of this patient.  If you have any questions, please do not hesitate to contact me. [Sincerely,] : Sincerely, [FreeTextEntry3] : López Vanessa DO

## 2023-05-05 NOTE — HISTORY OF PRESENT ILLNESS
[_______] : [unfilled] [___ mths] : [unfilled] month(s) ago [Paroxysmal] : paroxysmal [4] : a current pain level of 4/10 [5] : a minimum pain level of 5/10 [10] : a maximum pain level of 10/10 [Aching] : aching [Throbbing] : throbbing [Shooting] : shooting [Walking] : walking [Transitioning] : transitioning [Bending] : bending [Lifting] : lifting [Laying] : laying [Sitting] : sitting [Medications] : medications [FreeTextEntry1] : HPI - Ms. DYLAN CHRISTIAN is a 42 year F with PHx history of ITP, as below, referred by Dr Ladi Nicholas who presents today with chief complaint of abdominal pain. Initially in 2021 had hysterectomy, and hernia surgery in spring 2022. Reports that it developed following umbilical hernia repair It is located about the umbilicus;  there is radiation of the pain into the abdomen the pain is presently 9/10 in severity on the numerical rating scale. It is sharp and burning in nature. The pain is constant. There is diurnal worsening, during the course of the day.  The pain is aggravated with bending and lifting. The pain improves with rest. The pain is functionally and emotionally disabling for the patient as its preventing them from going about activities of daily living, such as routine housework. The pain does impair the patient’s ability to sleep. \par \par Patient was NOT  been seen by pain management in the past.\par Patient attests to  6 weeks of provider directed treatment, including a provider directed stretching regimen. \par Patient reports treatment that occurred within the last 3 months\par Patient report that symptoms have been persistent and and progressing after treatment\par  \par Reports there is NO present numbness, there is NO weakness. Patient denies any bowel/bladder incontinence, no saddle/perineal anesthesia or any other red flag signs or symptoms. Reports regular BMs.\par   [FreeTextEntry2] : 15 [FreeTextEntry7] : Referred Dr. Nicholas. Patient presents with abdominal pain following hernia surgery.

## 2023-05-05 NOTE — PHYSICAL EXAM
[de-identified] : General: Well-developed and well-nourished.  No acute distress.\par Psychiatric: Behavior was cooperative  \par Head:  Normocephalic and atraumatic\par Eyes:  Sclera white. Conjunctiva and eyelids pink and moist without discharge.\par Cardiovascular:  Regular\par Respiratory:  Trachea midline. Normal effort.\par No accessory muscle use with respiration\par Abdomen: Non distended, soft and nontender\par Skin:  No rashes, ulcers, or lesions appreciated.\par Neck: There is no pain with extension,     ROM wnl\par Back  There is no pain with extension,   ROM wnl\par Extremities: No edema \par Musculoskeletal: Moving all extremities freely \par Neuro: CN 2-12 Grossly intact\par Sensation to light touch is intact in all extremities\par Gait: Ambulates with no assistive device.

## 2023-05-26 ENCOUNTER — APPOINTMENT (OUTPATIENT)
Dept: PAIN MANAGEMENT | Facility: CLINIC | Age: 43
End: 2023-05-26
Payer: COMMERCIAL

## 2023-05-26 DIAGNOSIS — M79.10 MYALGIA, UNSPECIFIED SITE: ICD-10-CM

## 2023-05-26 PROCEDURE — 99213 OFFICE O/P EST LOW 20 MIN: CPT | Mod: 95

## 2023-05-26 NOTE — HISTORY OF PRESENT ILLNESS
[Home] : at home, [unfilled] , at the time of the visit. [Medical Office: ([facility])___] : at the medical office located in  [Verbal consent obtained from patient] : the patient, [unfilled] [FreeTextEntry1] : Interval Note:\par \par Since last visit the pain intensity has persisted\par \par Trailed an elastic binder which has been helpful\par \par Medication has not been tried yet\par \par Moving bowels regularly. No recent falls. \par \par Denies weakness, numbness. Patient denies any bowel/bladder incontinence, no saddle/perineal anesthesia or any other red flag signs or symptoms. \par \par ---\par \par HPI - Ms. DYLAN CHRISTIAN is a 42 year F with PHx history of ITP, as below, referred by Dr Ladi Nicholas who presents today with chief complaint of abdominal pain. Initially in 2021 had hysterectomy, and hernia surgery in spring 2022. Reports that it developed following umbilical hernia repair It is located about the umbilicus;  there is radiation of the pain into the abdomen the pain is presently 9/10 in severity on the numerical rating scale. It is sharp and burning in nature. The pain is constant. There is diurnal worsening, during the course of the day.  The pain is aggravated with bending and lifting. The pain improves with rest. The pain is functionally and emotionally disabling for the patient as its preventing them from going about activities of daily living, such as routine housework. The pain does impair the patient’s ability to sleep. \par \par Patient was NOT  been seen by pain management in the past.\par Patient attests to  6 weeks of provider directed treatment, including a provider directed stretching regimen. \par Patient reports treatment that occurred within the last 3 months\par Patient report that symptoms have been persistent and and progressing after treatment\par  \par Reports there is NO present numbness, there is NO weakness. Patient denies any bowel/bladder incontinence, no saddle/perineal anesthesia or any other red flag signs or symptoms. Reports regular BMs.\par

## 2023-05-26 NOTE — PHYSICAL EXAM
[de-identified] : Physical Exam (Telemedicine): \par Gen: AAOX3, NAD\par HEENT: PERRLA, EOMI, NCAT, NP\par Pulmonary: No Signs of Respiratory Distress\par MSK: AROM WFL, Limitations painful truncal flexion\par Neurological: Grossly neurologically intact, Noted deficits none\par Gait: Normal, Non-antalgic, Sans AD\par Derm: No Rash, (-)Lesions, (-)Erythema, (-)Cyanosis \par Psych: Calm, Cooperative, Conversational\par \par Disclaimer: This is a limited examination for the purposes of conducting a telemedicine visit during the COVID-19 pandemic. Physical exam maneuvers were modified as necessary to allow patient to self perform. A focused physician physical examination will be performed during in person visits and should be referred to to determine need for further testing, interventions or degree of disability.

## 2023-05-26 NOTE — ASSESSMENT
[FreeTextEntry1] : Ms. DYLAN CHRISTIAN is a 42 year F suffering from abdominal pain following umbilical hernia repair  that based upon today's subjective complaints, physical examination, and chart review is likely neuropathic in nature.\par \par >> Medications\par \par Chronic opioid use for non-malignant pain, in particular at high doses would not be recommended since it can potentially lead to hyperalgesia (hypersensitivity), tolerance and addiction. \par  \par Not amenable \par \par Flexeril 5 mg po bid prn spasms\par  \par Trial Diclofenac topically\par \par >> Interventions\par  \par Consider for Rectus sheath block\par  \par >> Therapy and Other Modalities\par  \par Apply Ice BID prn\par \par Abdominal Binder\par \par Continue with daily home stretching regimen\par \par >> Imaging and Other Studies\par \par See Media \par \par >> Consults\par \par None ordered

## 2023-05-30 ENCOUNTER — APPOINTMENT (OUTPATIENT)
Dept: PLASTIC SURGERY | Facility: CLINIC | Age: 43
End: 2023-05-30
Payer: COMMERCIAL

## 2023-05-30 ENCOUNTER — LABORATORY RESULT (OUTPATIENT)
Age: 43
End: 2023-05-30

## 2023-05-30 DIAGNOSIS — T81.89XA OTHER COMPLICATIONS OF PROCEDURES, NOT ELSEWHERE CLASSIFIED, INITIAL ENCOUNTER: ICD-10-CM

## 2023-05-30 PROCEDURE — 11421 EXC H-F-NK-SP B9+MARG 0.6-1: CPT

## 2023-05-30 NOTE — HISTORY OF PRESENT ILLNESS
[FreeTextEntry1] : 43 y/o female presents s/p Anterior and Left neck masses were removed at in office visit on 11/29/22. Pathology benign. Patient is here for a follow-up visit. Patient doing well complaining of small recurrent sebaceous cyst on left neck scar.\par \par Left neck scar well healed with .5cm nodular mass

## 2023-05-30 NOTE — PROCEDURE
[FreeTextEntry1] :  left neck mass [FreeTextEntry2] : Excision of  left neck mass [FreeTextEntry3] : lidocaine with epinephrine [FreeTextEntry4] : none [FreeTextEntry5] : none [FreeTextEntry6] : 2 cc lidocaine with epinephrine injected surrounding  neck mass.\par \par Skin prepped and draped in sterile fashion.\par 1 cm Left neck mass excised. Hemostasis obtained. Skin closed with  6-0 nylon sutures.\par \par \par Tolerated well.\par Tissue sent for pathology review.\par Follow up next week for suture removal.\par

## 2023-06-06 ENCOUNTER — APPOINTMENT (OUTPATIENT)
Dept: PLASTIC SURGERY | Facility: CLINIC | Age: 43
End: 2023-06-06
Payer: COMMERCIAL

## 2023-06-06 PROCEDURE — 99212 OFFICE O/P EST SF 10 MIN: CPT | Mod: 24

## 2023-06-06 NOTE — HISTORY OF PRESENT ILLNESS
[FreeTextEntry1] : 43 y/o female presents s/p re-excision of left neck mass in office on 5/30/23. She is also S/P bilateral breast reconstruction with implants. Patient is here for a follow-up visit to reassess healing and for suture removal \par \par Bilateral breast well healed implants soft and symmetrical.\par Incisions C/D/I. Skin edges well approximated. No collection or infection. No wound breakdown\par \par Path  has not been completed\par Sutures removed without complication.\par PO instructions reviewed. Answered all questions to full understanding. \par \par Discussed long term results of b/l breast reconstruction. Breasts well healed, good shape and symmetry.\par \par RTC as needed.\par \par \par \par \par

## 2023-06-19 ENCOUNTER — APPOINTMENT (OUTPATIENT)
Dept: PAIN MANAGEMENT | Facility: CLINIC | Age: 43
End: 2023-06-19
Payer: COMMERCIAL

## 2023-06-19 DIAGNOSIS — M79.2 NEURALGIA AND NEURITIS, UNSPECIFIED: ICD-10-CM

## 2023-06-19 PROCEDURE — 99213 OFFICE O/P EST LOW 20 MIN: CPT | Mod: 95

## 2023-06-19 NOTE — PHYSICAL EXAM
[de-identified] : Physical Exam (Telemedicine): \par \par Gen: AAOX3, NAD\par HEENT: PERRLA, EOMI, NCAT, NP\par Pulmonary: No Signs of Respiratory Distress\par MSK: AROM WFL, Limitations painful truncal flexion\par Neurological: Grossly neurologically intact, Noted deficits none\par Gait: Normal, Non-antalgic, Sans AD\par Derm: No Rash, (-)Lesions, (-)Erythema, (-)Cyanosis \par Psych: Calm, Cooperative, Conversational\par \par Disclaimer: This is a limited examination for the purposes of conducting a telemedicine visit during the COVID-19 pandemic. Physical exam maneuvers were modified as necessary to allow patient to self perform. A focused physician physical examination will be performed during in person visits and should be referred to to determine need for further testing, interventions or degree of disability.

## 2023-06-19 NOTE — HISTORY OF PRESENT ILLNESS
[FreeTextEntry1] : Interval Note:\par \par Since last visit the pain intensity has persisted\par \par Lately using Diclofenac with some improvement. \par \par Elastic binder causing discomfort and so she DC'ed\par \par Medication has been allowing patient to go about activities of daily living with less pain.\par \par Moving bowels regularly. No recent falls. \par \par Denies weakness, numbness. Patient denies any bowel/bladder incontinence, no saddle/perineal anesthesia or any other red flag signs or symptoms. \par \par ---\par \par HPI - Ms. DYLAN CHRISTIAN is a 42 year F with PHx history of ITP, as below, referred by Dr Ladi Nicholas who presents today with chief complaint of abdominal pain. Initially in 2021 had hysterectomy, and hernia surgery in spring 2022. Reports that it developed following umbilical hernia repair It is located about the umbilicus;  there is radiation of the pain into the abdomen the pain is presently 9/10 in severity on the numerical rating scale. It is sharp and burning in nature. The pain is constant. There is diurnal worsening, during the course of the day.  The pain is aggravated with bending and lifting. The pain improves with rest. The pain is functionally and emotionally disabling for the patient as its preventing them from going about activities of daily living, such as routine housework. The pain does impair the patient’s ability to sleep. \par \par Patient was NOT  been seen by pain management in the past.\par Patient attests to  6 weeks of provider directed treatment, including a provider directed stretching regimen. \par Patient reports treatment that occurred within the last 3 months\par Patient report that symptoms have been persistent and and progressing after treatment\par  \par Reports there is NO present numbness, there is NO weakness. Patient denies any bowel/bladder incontinence, no saddle/perineal anesthesia or any other red flag signs or symptoms. Reports regular BMs.\par

## 2023-06-27 ENCOUNTER — APPOINTMENT (OUTPATIENT)
Dept: CARDIOLOGY | Facility: CLINIC | Age: 43
End: 2023-06-27
Payer: COMMERCIAL

## 2023-06-27 ENCOUNTER — NON-APPOINTMENT (OUTPATIENT)
Age: 43
End: 2023-06-27

## 2023-06-27 VITALS
HEIGHT: 69 IN | WEIGHT: 148 LBS | RESPIRATION RATE: 14 BRPM | OXYGEN SATURATION: 99 % | BODY MASS INDEX: 21.92 KG/M2 | HEART RATE: 64 BPM | SYSTOLIC BLOOD PRESSURE: 100 MMHG | DIASTOLIC BLOOD PRESSURE: 60 MMHG

## 2023-06-27 DIAGNOSIS — Z82.49 FAMILY HISTORY OF ISCHEMIC HEART DISEASE AND OTHER DISEASES OF THE CIRCULATORY SYSTEM: ICD-10-CM

## 2023-06-27 DIAGNOSIS — Z86.718 PERSONAL HISTORY OF OTHER VENOUS THROMBOSIS AND EMBOLISM: ICD-10-CM

## 2023-06-27 PROCEDURE — 93000 ELECTROCARDIOGRAM COMPLETE: CPT

## 2023-06-27 PROCEDURE — 99204 OFFICE O/P NEW MOD 45 MIN: CPT | Mod: 25

## 2023-06-27 NOTE — ASSESSMENT
[FreeTextEntry1] : 42 yo female with intermittent exertional SOB and burning chest discomfort. \par ECG today demonstrated sinus rhythm, RSR' pattern in V1, and nonspecific T wave abnormalities.\par \par WIll perform treadmill stress ECG for ischemic evaluation/risk stratification.\par Will perform echocardiogram to assess LV function and structural heart disease.\par After review of test results, will determine if further cardiac work-up or intervention is clinically indicated.\par

## 2023-06-27 NOTE — HISTORY OF PRESENT ILLNESS
[FreeTextEntry1] : 44 yo female who presents today for cardiac evaluation of intermittent exertional SOB and burning chest discomfort. She is a runner and runs 6-8 miles daily, but reports that she now has to stop every mile due to SOB. She also reports that her BP has been intermittently elevated over the past 1-2 months, but has lately been normotensive. Patient denies palpitations, syncope, edema, melena, hematochezia, or hematemesis.\par

## 2023-06-27 NOTE — PHYSICAL EXAM
[Well Developed] : well developed [Well Nourished] : well nourished [No Acute Distress] : no acute distress [Normal Conjunctiva] : normal conjunctiva [Normal Venous Pressure] : normal venous pressure [Normal S1, S2] : normal S1, S2 [No Murmur] : no murmur [No Rub] : no rub [No Gallop] : no gallop [Clear Lung Fields] : clear lung fields [Good Air Entry] : good air entry [No Respiratory Distress] : no respiratory distress  [No Edema] : no edema [Moves all extremities] : moves all extremities [No Focal Deficits] : no focal deficits [Normal Speech] : normal speech [Alert and Oriented] : alert and oriented [Normal memory] : normal memory

## 2023-06-27 NOTE — CARDIOLOGY SUMMARY
[de-identified] : \par 6/27/23 ECG: Sinus rhythm, rate 70 bpm, RSR' pattern in V1, nonspecific T wave abnormalities\par

## 2023-06-29 ENCOUNTER — APPOINTMENT (OUTPATIENT)
Dept: HEART AND VASCULAR | Facility: CLINIC | Age: 43
End: 2023-06-29

## 2023-07-24 ENCOUNTER — APPOINTMENT (OUTPATIENT)
Dept: CARDIOLOGY | Facility: CLINIC | Age: 43
End: 2023-07-24
Payer: COMMERCIAL

## 2023-07-24 PROCEDURE — 93306 TTE W/DOPPLER COMPLETE: CPT

## 2023-07-25 ENCOUNTER — NON-APPOINTMENT (OUTPATIENT)
Age: 43
End: 2023-07-25

## 2023-07-26 ENCOUNTER — APPOINTMENT (OUTPATIENT)
Dept: CARDIOLOGY | Facility: CLINIC | Age: 43
End: 2023-07-26
Payer: COMMERCIAL

## 2023-07-26 DIAGNOSIS — R06.02 SHORTNESS OF BREATH: ICD-10-CM

## 2023-07-26 DIAGNOSIS — R07.9 CHEST PAIN, UNSPECIFIED: ICD-10-CM

## 2023-07-26 PROCEDURE — 36415 COLL VENOUS BLD VENIPUNCTURE: CPT

## 2023-07-26 PROCEDURE — 93015 CV STRESS TEST SUPVJ I&R: CPT

## 2023-07-27 LAB — DEPRECATED D DIMER PPP IA-ACNC: <150 NG/ML DDU

## 2023-08-24 ENCOUNTER — NON-APPOINTMENT (OUTPATIENT)
Age: 43
End: 2023-08-24

## 2023-10-25 ENCOUNTER — APPOINTMENT (OUTPATIENT)
Dept: PLASTIC SURGERY | Facility: CLINIC | Age: 43
End: 2023-10-25
Payer: COMMERCIAL

## 2023-10-25 PROCEDURE — 99213 OFFICE O/P EST LOW 20 MIN: CPT

## 2024-01-18 ENCOUNTER — APPOINTMENT (OUTPATIENT)
Dept: SURGERY | Facility: CLINIC | Age: 44
End: 2024-01-18
Payer: COMMERCIAL

## 2024-01-18 VITALS
WEIGHT: 148 LBS | SYSTOLIC BLOOD PRESSURE: 133 MMHG | HEIGHT: 69 IN | HEART RATE: 78 BPM | TEMPERATURE: 98.2 F | BODY MASS INDEX: 21.92 KG/M2 | DIASTOLIC BLOOD PRESSURE: 83 MMHG

## 2024-01-18 DIAGNOSIS — R10.33 PERIUMBILICAL PAIN: ICD-10-CM

## 2024-01-18 PROCEDURE — 99214 OFFICE O/P EST MOD 30 MIN: CPT

## 2024-01-18 RX ORDER — DICLOFENAC SODIUM 1% 10 MG/G
1 GEL TOPICAL DAILY
Qty: 1 | Refills: 1 | Status: DISCONTINUED | COMMUNITY
Start: 2023-05-26 | End: 2024-01-18

## 2024-01-18 RX ORDER — CYCLOBENZAPRINE HYDROCHLORIDE 5 MG/1
5 TABLET, FILM COATED ORAL
Qty: 45 | Refills: 2 | Status: DISCONTINUED | COMMUNITY
Start: 2023-05-05 | End: 2024-01-18

## 2024-01-18 NOTE — CONSULT LETTER
[Dear  ___] : Dear  [unfilled], [Consult Letter:] : I had the pleasure of evaluating your patient, [unfilled]. [Please see my note below.] : Please see my note below. [Sincerely,] : Sincerely, [FreeTextEntry3] : Ladi Nicholas

## 2024-01-18 NOTE — HISTORY OF PRESENT ILLNESS
[de-identified] : Umbilical hernia repair 2022 with Dr Holbrook, seen in April 2023 for abdominal pain around umbilicus.  CT at that time showed ventral bulge but no hernia recurrence.  Here for follow up [de-identified] : 43 year F s/p hysterectomy 2021, and umbilical hernia repair 2022. Seen in 2023 for abdominal pain that developed following umbilical hernia repair It is located about the umbilicus; there is radiation of the pain to the right side.  Seen by pain Mangement Dr. Vanessa last year but without a resolution for the pain.  She states that the pain has been somewhat tolerable since last June but over the past few weeks the pain has retuned and is a 9/10 It is sharp and burning in nature. The pain is constant.  The pain is aggravated with bending and lifting. The pain improves with rest.  The pain impair the patient's ability to sleep and to carry out daily activities

## 2024-01-18 NOTE — PHYSICAL EXAM
[JVD] : no jugular venous distention  [Normal Breath Sounds] : Normal breath sounds [Normal Rate and Rhythm] : normal rate and rhythm [No Rash or Lesion] : No rash or lesion [Alert] : alert [Calm] : calm [de-identified] : soft, tender to touch around umbilicus and right hemiabdomen, no hernia recurrence

## 2024-01-18 NOTE — ASSESSMENT
[FreeTextEntry1] :  43 yo F w/ chronic umbilical/abdominal pain following open umbilical hernia repair March 2022 with no visible recurrence on CT A/P 2023.  Pain may be do to scarring around the mesh and should continue to improve with time.  If no improvement can consider mesh explantation.  This will be a larger surgery and will increase her risk of hernia recurrence.  It also may not solve the issue of her pain.   Will get MRI abdomen to asses abdominal wall and help identify source of pain. Recommend NSAID and tylenol with ice pack.

## 2024-01-22 ENCOUNTER — APPOINTMENT (OUTPATIENT)
Dept: BREAST CENTER | Facility: CLINIC | Age: 44
End: 2024-01-22
Payer: COMMERCIAL

## 2024-01-22 VITALS
SYSTOLIC BLOOD PRESSURE: 128 MMHG | DIASTOLIC BLOOD PRESSURE: 88 MMHG | HEIGHT: 69 IN | WEIGHT: 148 LBS | HEART RATE: 72 BPM | BODY MASS INDEX: 21.92 KG/M2

## 2024-01-22 DIAGNOSIS — R92.30 DENSE BREASTS, UNSPECIFIED: ICD-10-CM

## 2024-01-22 PROCEDURE — 99214 OFFICE O/P EST MOD 30 MIN: CPT

## 2024-01-22 NOTE — PHYSICAL EXAM
[Normocephalic] : normocephalic [Atraumatic] : atraumatic [Supple] : supple [No Supraclavicular Adenopathy] : no supraclavicular adenopathy [Examined in the supine and seated position] : examined in the supine and seated position [Symmetrical] : symmetrical [No dominant masses] : no dominant masses in right breast  [No dominant masses] : no dominant masses left breast [No Nipple Retraction] : no left nipple retraction [No Nipple Discharge] : no left nipple discharge [No Axillary Lymphadenopathy] : no left axillary lymphadenopathy [No Edema] : no edema [No Rashes] : no rashes [No Ulceration] : no ulceration [de-identified] : s/p NSM and implant reconstx, no masses [de-identified] : there is lateral movement of the pocket, reassured her the lumps she thinks she is seeing her implant protruding

## 2024-01-22 NOTE — ASSESSMENT
[FreeTextEntry1] : Breast implant status (V43.82) (Z98.82) 42 yo female with high risk  reviewed her risk status  We reviewed risk reduction strategies including maintaining a BMI <25, limiting red meat intake and alcoholic beverages to 3 per week and exercise (150 min/ week low intensity or 75 min/week high intensity). And maintaining a normal vitamin D level.  plan MRI 11/2025  f/u with me 1 year, Dr. Sorensen 6 months  She knows to call or return sooner should any concerns or questions arise.
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
## 2024-01-22 NOTE — CONSULT LETTER
[Dear  ___] : Dear  [unfilled], [Courtesy Letter:] : I had the pleasure of seeing your patient, [unfilled], in my office today. [Please see my note below.] : Please see my note below. [Consult Closing:] : Thank you very much for allowing me to participate in the care of this patient.  If you have any questions, please do not hesitate to contact me. [Sincerely,] : Sincerely, [DrLoan  ___] : Dr. BERRIOS [FreeTextEntry3] : Ginger Dolan MS DO\par  Breast Surgeon\par  [facility] \[place] [address]\par

## 2024-02-15 ENCOUNTER — RESULT REVIEW (OUTPATIENT)
Age: 44
End: 2024-02-15

## 2024-02-26 ENCOUNTER — NON-APPOINTMENT (OUTPATIENT)
Age: 44
End: 2024-02-26

## 2024-03-11 ENCOUNTER — NON-APPOINTMENT (OUTPATIENT)
Age: 44
End: 2024-03-11

## 2024-05-13 LAB
ALBUMIN SERPL ELPH-MCNC: 4.7 G/DL
ALP BLD-CCNC: 81 U/L
ALT SERPL-CCNC: 9 U/L
ANION GAP SERPL CALC-SCNC: 13 MMOL/L
AST SERPL-CCNC: 25 U/L
BILIRUB SERPL-MCNC: 1.3 MG/DL
BUN SERPL-MCNC: 5 MG/DL
CALCIUM SERPL-MCNC: 9.9 MG/DL
CHLORIDE SERPL-SCNC: 106 MMOL/L
CO2 SERPL-SCNC: 24 MMOL/L
CREAT SERPL-MCNC: 0.87 MG/DL
EGFR: 85 ML/MIN/1.73M2
GLUCOSE SERPL-MCNC: 92 MG/DL
POTASSIUM SERPL-SCNC: 4.3 MMOL/L
PROT SERPL-MCNC: 6.9 G/DL
SODIUM SERPL-SCNC: 143 MMOL/L

## 2024-08-31 ENCOUNTER — NON-APPOINTMENT (OUTPATIENT)
Age: 44
End: 2024-08-31

## 2025-06-16 ENCOUNTER — APPOINTMENT (OUTPATIENT)
Dept: BREAST CENTER | Facility: CLINIC | Age: 45
End: 2025-06-16
Payer: COMMERCIAL

## 2025-06-16 VITALS
DIASTOLIC BLOOD PRESSURE: 64 MMHG | SYSTOLIC BLOOD PRESSURE: 93 MMHG | BODY MASS INDEX: 19.99 KG/M2 | HEIGHT: 69 IN | WEIGHT: 135 LBS | HEART RATE: 87 BPM

## 2025-06-16 PROBLEM — Z91.89 AT HIGH RISK FOR BREAST CANCER: Status: ACTIVE | Noted: 2025-06-16

## 2025-06-16 PROCEDURE — 99214 OFFICE O/P EST MOD 30 MIN: CPT

## 2025-06-24 ENCOUNTER — APPOINTMENT (OUTPATIENT)
Dept: PLASTIC SURGERY | Facility: CLINIC | Age: 45
End: 2025-06-24
Payer: COMMERCIAL

## 2025-06-24 PROCEDURE — 99214 OFFICE O/P EST MOD 30 MIN: CPT

## 2025-06-30 ENCOUNTER — NON-APPOINTMENT (OUTPATIENT)
Age: 45
End: 2025-06-30

## 2025-07-16 ENCOUNTER — APPOINTMENT (OUTPATIENT)
Dept: INTERNAL MEDICINE | Facility: CLINIC | Age: 45
End: 2025-07-16
Payer: COMMERCIAL

## 2025-07-16 VITALS
OXYGEN SATURATION: 98 % | HEART RATE: 85 BPM | HEIGHT: 69 IN | TEMPERATURE: 97.1 F | WEIGHT: 140 LBS | SYSTOLIC BLOOD PRESSURE: 111 MMHG | BODY MASS INDEX: 20.73 KG/M2 | DIASTOLIC BLOOD PRESSURE: 78 MMHG

## 2025-07-16 PROBLEM — D68.59 THROMBOPHILIA: Status: ACTIVE | Noted: 2021-11-10

## 2025-07-16 PROCEDURE — 99205 OFFICE O/P NEW HI 60 MIN: CPT

## 2025-07-16 PROCEDURE — 93000 ELECTROCARDIOGRAM COMPLETE: CPT

## 2025-07-17 LAB
ALBUMIN SERPL ELPH-MCNC: 4.7 G/DL
ALP BLD-CCNC: 91 U/L
ALT SERPL-CCNC: 22 U/L
ANION GAP SERPL CALC-SCNC: 15 MMOL/L
AST SERPL-CCNC: 24 U/L
BILIRUB SERPL-MCNC: 1.1 MG/DL
BUN SERPL-MCNC: 11 MG/DL
CALCIUM SERPL-MCNC: 10 MG/DL
CHLORIDE SERPL-SCNC: 105 MMOL/L
CO2 SERPL-SCNC: 21 MMOL/L
CREAT SERPL-MCNC: 0.73 MG/DL
EGFRCR SERPLBLD CKD-EPI 2021: 103 ML/MIN/1.73M2
GLUCOSE SERPL-MCNC: 68 MG/DL
HCT VFR BLD CALC: 43.1 %
HGB BLD-MCNC: 13.9 G/DL
MCHC RBC-ENTMCNC: 29.7 PG
MCHC RBC-ENTMCNC: 32.3 G/DL
MCV RBC AUTO: 92.1 FL
PLATELET # BLD AUTO: 224 K/UL
POTASSIUM SERPL-SCNC: 4.5 MMOL/L
PROT SERPL-MCNC: 7.2 G/DL
RBC # BLD: 4.68 M/UL
RBC # FLD: 12.6 %
SODIUM SERPL-SCNC: 141 MMOL/L
WBC # FLD AUTO: 4.96 K/UL

## 2025-07-22 ENCOUNTER — RESULT REVIEW (OUTPATIENT)
Age: 45
End: 2025-07-22

## 2025-07-25 ENCOUNTER — NON-APPOINTMENT (OUTPATIENT)
Age: 45
End: 2025-07-25

## 2025-07-25 ENCOUNTER — APPOINTMENT (OUTPATIENT)
Dept: OBGYN | Facility: CLINIC | Age: 45
End: 2025-07-25

## 2025-07-25 PROCEDURE — 76830 TRANSVAGINAL US NON-OB: CPT | Mod: 26

## 2025-07-31 DIAGNOSIS — Z98.890 OTHER SPECIFIED POSTPROCEDURAL STATES: ICD-10-CM

## 2025-07-31 RX ORDER — RIVAROXABAN 10 MG/1
10 TABLET, FILM COATED ORAL DAILY
Qty: 7 | Refills: 0 | Status: ACTIVE | COMMUNITY
Start: 2025-07-31 | End: 1900-01-01

## 2025-08-05 ENCOUNTER — RESULT REVIEW (OUTPATIENT)
Age: 45
End: 2025-08-05

## 2025-08-06 ENCOUNTER — TRANSCRIPTION ENCOUNTER (OUTPATIENT)
Age: 45
End: 2025-08-06

## 2025-08-11 ENCOUNTER — APPOINTMENT (OUTPATIENT)
Dept: PLASTIC SURGERY | Facility: CLINIC | Age: 45
End: 2025-08-11
Payer: COMMERCIAL

## 2025-08-11 PROCEDURE — 99024 POSTOP FOLLOW-UP VISIT: CPT
